# Patient Record
Sex: MALE | Race: ASIAN
[De-identification: names, ages, dates, MRNs, and addresses within clinical notes are randomized per-mention and may not be internally consistent; named-entity substitution may affect disease eponyms.]

---

## 2019-05-08 ENCOUNTER — RX RENEWAL (OUTPATIENT)
Age: 49
End: 2019-05-08

## 2019-07-19 ENCOUNTER — LABORATORY RESULT (OUTPATIENT)
Age: 49
End: 2019-07-19

## 2019-07-19 ENCOUNTER — APPOINTMENT (OUTPATIENT)
Dept: PULMONOLOGY | Facility: CLINIC | Age: 49
End: 2019-07-19
Payer: COMMERCIAL

## 2019-07-19 ENCOUNTER — NON-APPOINTMENT (OUTPATIENT)
Age: 49
End: 2019-07-19

## 2019-07-19 ENCOUNTER — RESULT CHARGE (OUTPATIENT)
Age: 49
End: 2019-07-19

## 2019-07-19 VITALS
DIASTOLIC BLOOD PRESSURE: 83 MMHG | OXYGEN SATURATION: 95 % | WEIGHT: 150 LBS | HEIGHT: 67 IN | SYSTOLIC BLOOD PRESSURE: 127 MMHG | HEART RATE: 56 BPM | BODY MASS INDEX: 23.54 KG/M2

## 2019-07-19 PROCEDURE — 99396 PREV VISIT EST AGE 40-64: CPT | Mod: 25

## 2019-07-19 PROCEDURE — 81003 URINALYSIS AUTO W/O SCOPE: CPT | Mod: QW

## 2019-07-19 PROCEDURE — 93000 ELECTROCARDIOGRAM COMPLETE: CPT

## 2019-07-19 PROCEDURE — 36415 COLL VENOUS BLD VENIPUNCTURE: CPT

## 2019-07-19 NOTE — DATA REVIEWED
[FreeTextEntry1] : EKG showed sinus bradycardia at 55 beats per minute, WPW pattern, no acute ST changes\par \par Urinalysis is negative

## 2019-07-19 NOTE — PLAN
[FreeTextEntry1] : Venipuncture with labs drawn in office\par GI evaluation with Dr. Robert Brunner for colonoscopy

## 2019-07-21 LAB
25(OH)D3 SERPL-MCNC: 32.3 NG/ML
ALBUMIN SERPL ELPH-MCNC: 4.8 G/DL
ALP BLD-CCNC: 54 U/L
ALT SERPL-CCNC: 34 U/L
ANION GAP SERPL CALC-SCNC: 13 MMOL/L
AST SERPL-CCNC: 24 U/L
BASOPHILS # BLD AUTO: 0.11 K/UL
BASOPHILS NFR BLD AUTO: 1.8 %
BILIRUB SERPL-MCNC: 0.7 MG/DL
BILIRUB UR QL STRIP: NORMAL
BUN SERPL-MCNC: 11 MG/DL
CALCIUM SERPL-MCNC: 9.5 MG/DL
CHLORIDE SERPL-SCNC: 103 MMOL/L
CHOLEST SERPL-MCNC: 157 MG/DL
CHOLEST/HDLC SERPL: 3.1 RATIO
CK SERPL-CCNC: 101 U/L
CO2 SERPL-SCNC: 26 MMOL/L
CREAT SERPL-MCNC: 0.92 MG/DL
EOSINOPHIL # BLD AUTO: 0.2 K/UL
EOSINOPHIL NFR BLD AUTO: 3.5 %
ESTIMATED AVERAGE GLUCOSE: 111 MG/DL
GLUCOSE SERPL-MCNC: 91 MG/DL
GLUCOSE UR-MCNC: NORMAL
HBA1C MFR BLD HPLC: 5.5 %
HCG UR QL: 0.2 EU/DL
HCT VFR BLD CALC: 46.2 %
HCV AB SER QL: NONREACTIVE
HCV S/CO RATIO: 0.11 S/CO
HDLC SERPL-MCNC: 50 MG/DL
HGB BLD-MCNC: 15 G/DL
HGB UR QL STRIP.AUTO: NORMAL
KETONES UR-MCNC: NORMAL
LDLC SERPL CALC-MCNC: 92 MG/DL
LEUKOCYTE ESTERASE UR QL STRIP: NORMAL
LYMPHOCYTES # BLD AUTO: 1.74 K/UL
LYMPHOCYTES NFR BLD AUTO: 29.8 %
MAGNESIUM SERPL-MCNC: 2.2 MG/DL
MAN DIFF?: NORMAL
MCHC RBC-ENTMCNC: 31.3 PG
MCHC RBC-ENTMCNC: 32.5 GM/DL
MCV RBC AUTO: 96.3 FL
MONOCYTES # BLD AUTO: 0.46 K/UL
MONOCYTES NFR BLD AUTO: 7.9 %
NEUTROPHILS # BLD AUTO: 3.33 K/UL
NEUTROPHILS NFR BLD AUTO: 57 %
NITRITE UR QL STRIP: NORMAL
PH UR STRIP: 7
PHOSPHATE SERPL-MCNC: 3.2 MG/DL
PLATELET # BLD AUTO: 185 K/UL
POTASSIUM SERPL-SCNC: 4.7 MMOL/L
PROT SERPL-MCNC: 6.8 G/DL
PROT UR STRIP-MCNC: NORMAL
PSA SERPL-MCNC: 0.46 NG/ML
RBC # BLD: 4.8 M/UL
RBC # FLD: 12.2 %
SODIUM SERPL-SCNC: 142 MMOL/L
SP GR UR STRIP: 1.01
T3 SERPL-MCNC: 96 NG/DL
T3RU NFR SERPL: 0.9 TBI
T4 FREE SERPL-MCNC: 1.5 NG/DL
T4 SERPL-MCNC: 6.2 UG/DL
TRIGL SERPL-MCNC: 74 MG/DL
TSH SERPL-ACNC: 1.73 UIU/ML
WBC # FLD AUTO: 5.85 K/UL

## 2019-09-05 ENCOUNTER — APPOINTMENT (OUTPATIENT)
Dept: GASTROENTEROLOGY | Facility: CLINIC | Age: 49
End: 2019-09-05

## 2020-05-27 ENCOUNTER — RX RENEWAL (OUTPATIENT)
Age: 50
End: 2020-05-27

## 2021-04-21 ENCOUNTER — RX RENEWAL (OUTPATIENT)
Age: 51
End: 2021-04-21

## 2021-05-03 ENCOUNTER — NON-APPOINTMENT (OUTPATIENT)
Age: 51
End: 2021-05-03

## 2021-05-27 ENCOUNTER — LABORATORY RESULT (OUTPATIENT)
Age: 51
End: 2021-05-27

## 2021-05-27 ENCOUNTER — NON-APPOINTMENT (OUTPATIENT)
Age: 51
End: 2021-05-27

## 2021-05-27 ENCOUNTER — APPOINTMENT (OUTPATIENT)
Dept: PULMONOLOGY | Facility: CLINIC | Age: 51
End: 2021-05-27
Payer: COMMERCIAL

## 2021-05-27 VITALS
WEIGHT: 159.56 LBS | DIASTOLIC BLOOD PRESSURE: 83 MMHG | BODY MASS INDEX: 25.04 KG/M2 | HEART RATE: 64 BPM | SYSTOLIC BLOOD PRESSURE: 116 MMHG | TEMPERATURE: 98.2 F | HEIGHT: 67 IN | OXYGEN SATURATION: 96 %

## 2021-05-27 LAB
BILIRUB UR QL STRIP: NORMAL
CLARITY UR: CLEAR
COLLECTION METHOD: NORMAL
GLUCOSE UR-MCNC: NORMAL
HCG UR QL: 0.2 EU/DL
HGB UR QL STRIP.AUTO: NORMAL
KETONES UR-MCNC: NORMAL
LEUKOCYTE ESTERASE UR QL STRIP: NORMAL
NITRITE UR QL STRIP: NORMAL
PH UR STRIP: 7
PROT UR STRIP-MCNC: NORMAL
SP GR UR STRIP: 1.01

## 2021-05-27 PROCEDURE — 99072 ADDL SUPL MATRL&STAF TM PHE: CPT

## 2021-05-27 PROCEDURE — 99396 PREV VISIT EST AGE 40-64: CPT | Mod: 25

## 2021-05-27 PROCEDURE — 36415 COLL VENOUS BLD VENIPUNCTURE: CPT

## 2021-05-27 PROCEDURE — 93000 ELECTROCARDIOGRAM COMPLETE: CPT

## 2021-05-27 PROCEDURE — 82044 UR ALBUMIN SEMIQUANTITATIVE: CPT | Mod: QW

## 2021-05-27 PROCEDURE — 81003 URINALYSIS AUTO W/O SCOPE: CPT | Mod: QW

## 2021-05-29 NOTE — DATA REVIEWED
[FreeTextEntry1] : EKG showed sinus rhythm at 61 bpm, WPW pattern.\par \par \par  POCT - A Urinalysis Dip (Automated)             Final\par \par No Documents Attached\par \par \par   Test   Result   Flag Reference Goal \par   Glucose NEG      \par   Bilirubin NEG      \par   Ketone NEG      \par   Specific Gravity 1.010      \par   Blood NEG      \par   pH 7.0      \par   Protein NEG      \par   Urobilinogen 0.2 EU/dl      \par   Nitrite NEG      \par   Leukocytes NEG      \par   Clarity Clear      \par   Collection Method Void      \par \par  Ordered by: NATALIE ENNIS       Collected/Examined: [id number] 09:43AM       \par Verified by: NATALIE ENNIS [id number] 10:06AM       \par  Result Communication: No patient communication needed at this time;\par Stage: Final       \par  Performed at: In Office       Performed by: NATALIE ENNIS       Resulted: [id number] 09:43AM       Last Updated: [id number] 10:06AM       Accession: [id number]       \par

## 2021-05-29 NOTE — PLAN
[FreeTextEntry1] : Dr. Shan Escobar cardiology evaluation for WPW pattern shown on EKG.\par Venipuncture with labs drawn in office\par Ergocalciferol for vitamin D deficiency

## 2021-05-30 LAB
25(OH)D3 SERPL-MCNC: 20.7 NG/ML
ALBUMIN SERPL ELPH-MCNC: 4.6 G/DL
ALBUMIN: 10
ALP BLD-CCNC: 52 U/L
ALT SERPL-CCNC: 29 U/L
ANION GAP SERPL CALC-SCNC: 12 MMOL/L
AST SERPL-CCNC: 15 U/L
BASOPHILS # BLD AUTO: 0.05 K/UL
BASOPHILS NFR BLD AUTO: 0.7 %
BILIRUB SERPL-MCNC: 0.6 MG/DL
BUN SERPL-MCNC: 9 MG/DL
CALCIUM SERPL-MCNC: 9.2 MG/DL
CHLORIDE SERPL-SCNC: 103 MMOL/L
CHOLEST SERPL-MCNC: 204 MG/DL
CO2 SERPL-SCNC: 26 MMOL/L
CREAT SERPL-MCNC: 0.75 MG/DL
CREATININE: 50
EOSINOPHIL # BLD AUTO: 0.25 K/UL
EOSINOPHIL NFR BLD AUTO: 3.7 %
ESTIMATED AVERAGE GLUCOSE: 108 MG/DL
GLUCOSE SERPL-MCNC: 83 MG/DL
HBA1C MFR BLD HPLC: 5.4 %
HCT VFR BLD CALC: 45.9 %
HCV AB SER QL: NONREACTIVE
HCV S/CO RATIO: 0.11 S/CO
HDLC SERPL-MCNC: 49 MG/DL
HGB BLD-MCNC: 15.1 G/DL
IMM GRANULOCYTES NFR BLD AUTO: 0.1 %
LDLC SERPL CALC-MCNC: 132 MG/DL
LYMPHOCYTES # BLD AUTO: 1.61 K/UL
LYMPHOCYTES NFR BLD AUTO: 23.7 %
MAGNESIUM SERPL-MCNC: 2.1 MG/DL
MAN DIFF?: NORMAL
MCHC RBC-ENTMCNC: 31.1 PG
MCHC RBC-ENTMCNC: 32.9 GM/DL
MCV RBC AUTO: 94.6 FL
MICROALBUMIN/CREAT UR TEST STR-RTO: 30
MONOCYTES # BLD AUTO: 0.49 K/UL
MONOCYTES NFR BLD AUTO: 7.2 %
NEUTROPHILS # BLD AUTO: 4.38 K/UL
NEUTROPHILS NFR BLD AUTO: 64.6 %
NONHDLC SERPL-MCNC: 155 MG/DL
PHOSPHATE SERPL-MCNC: 3.3 MG/DL
PLATELET # BLD AUTO: 181 K/UL
POTASSIUM SERPL-SCNC: 3.9 MMOL/L
PROT SERPL-MCNC: 6.8 G/DL
PSA SERPL-MCNC: 0.44 NG/ML
RBC # BLD: 4.85 M/UL
RBC # FLD: 11.9 %
SODIUM SERPL-SCNC: 141 MMOL/L
T3 SERPL-MCNC: 96 NG/DL
T3RU NFR SERPL: 1 TBI
T4 FREE SERPL-MCNC: 1.5 NG/DL
T4 SERPL-MCNC: 6.7 UG/DL
TRIGL SERPL-MCNC: 116 MG/DL
TSH SERPL-ACNC: 1.57 UIU/ML
WBC # FLD AUTO: 6.79 K/UL

## 2021-05-30 RX ORDER — ERGOCALCIFEROL 1.25 MG/1
1.25 MG CAPSULE, LIQUID FILLED ORAL
Qty: 12 | Refills: 3 | Status: ACTIVE | COMMUNITY
Start: 2021-05-30 | End: 1900-01-01

## 2021-06-03 ENCOUNTER — TRANSCRIPTION ENCOUNTER (OUTPATIENT)
Age: 51
End: 2021-06-03

## 2022-03-31 ENCOUNTER — RX RENEWAL (OUTPATIENT)
Age: 52
End: 2022-03-31

## 2022-06-22 ENCOUNTER — NON-APPOINTMENT (OUTPATIENT)
Age: 52
End: 2022-06-22

## 2022-07-22 ENCOUNTER — LABORATORY RESULT (OUTPATIENT)
Age: 52
End: 2022-07-22

## 2022-07-22 ENCOUNTER — NON-APPOINTMENT (OUTPATIENT)
Age: 52
End: 2022-07-22

## 2022-07-22 ENCOUNTER — APPOINTMENT (OUTPATIENT)
Dept: PULMONOLOGY | Facility: CLINIC | Age: 52
End: 2022-07-22
Payer: COMMERCIAL

## 2022-07-22 VITALS
OXYGEN SATURATION: 96 % | HEART RATE: 76 BPM | DIASTOLIC BLOOD PRESSURE: 87 MMHG | WEIGHT: 151 LBS | SYSTOLIC BLOOD PRESSURE: 133 MMHG | BODY MASS INDEX: 23.7 KG/M2 | TEMPERATURE: 97.9 F | HEIGHT: 67 IN

## 2022-07-22 PROCEDURE — 99396 PREV VISIT EST AGE 40-64: CPT | Mod: 25

## 2022-07-22 PROCEDURE — 82044 UR ALBUMIN SEMIQUANTITATIVE: CPT | Mod: QW

## 2022-07-22 PROCEDURE — 81003 URINALYSIS AUTO W/O SCOPE: CPT | Mod: QW

## 2022-07-22 PROCEDURE — 36415 COLL VENOUS BLD VENIPUNCTURE: CPT

## 2022-07-22 PROCEDURE — 93000 ELECTROCARDIOGRAM COMPLETE: CPT

## 2022-07-22 RX ORDER — ZOSTER VACCINE RECOMBINANT, ADJUVANTED 50 MCG/0.5
50 KIT INTRAMUSCULAR
Qty: 1 | Refills: 1 | Status: ACTIVE | COMMUNITY
Start: 2022-07-22 | End: 1900-01-01

## 2022-07-24 NOTE — PLAN
[FreeTextEntry1] : Venipuncture with labs drawn in office\par Age-appropriate counseling and preventive care screening discussed.\par Advised him to get colonoscopy with Dr. Nielsen.\par Start home blood pressure log book.\par Return for blood pressure follow-up in 1 month.

## 2022-07-24 NOTE — HISTORY OF PRESENT ILLNESS
[FreeTextEntry1] : History: Is a pleasant 52-year-old gentleman without significant past medical history, who came in for annual physical examination today.

## 2022-07-24 NOTE — DATA REVIEWED
[FreeTextEntry1] : EKG shows sinus rhythm at 60 bpm, old WPW pattern.\par \par \par  POCT - A Urinalysis Dip (Automated)             Final\par \par No Documents Attached\par \par \par   Test   Result   Flag Reference Goal Last Verified \par   Glucose NEGATIVE      REQUIRED \par   Bilirubin NEGATIVE      REQUIRED \par   Ketone NEGATIVE      REQUIRED \par   Specific Gravity 1.015      REQUIRED \par   Blood NEGATIVE      REQUIRED \par   pH 8.5      REQUIRED \par   Protein NEGATIVE      REQUIRED \par   Urobilinogen 0.2 EU/dl      REQUIRED \par   Nitrite NEGATIVE      REQUIRED \par   Leukocytes NEGATIVE      REQUIRED \par   Clarity Clear      REQUIRED \par   Collection Method Void      REQUIRED \par \par  Ordered by: NATALIE ENNIS       Collected/Examined: [id number] 09:19AM       \par Verification Required       Stage: Final       \par  Performed at: In Office       Performed by: NATALIE ENNIS       Resulted: [id number] 09:19AM       Last Updated: [id number] 09:20AM       \par

## 2022-07-26 LAB
25(OH)D3 SERPL-MCNC: 32.8 NG/ML
ALBUMIN SERPL ELPH-MCNC: 4.8 G/DL
ALBUMIN: 30
ALP BLD-CCNC: 45 U/L
ALT SERPL-CCNC: 26 U/L
ANION GAP SERPL CALC-SCNC: 13 MMOL/L
AST SERPL-CCNC: 20 U/L
BASOPHILS # BLD AUTO: 0.03 K/UL
BASOPHILS NFR BLD AUTO: 0.5 %
BILIRUB SERPL-MCNC: 0.6 MG/DL
BILIRUB UR QL STRIP: NEGATIVE
BUN SERPL-MCNC: 12 MG/DL
CALCIUM SERPL-MCNC: 9.4 MG/DL
CHLORIDE SERPL-SCNC: 105 MMOL/L
CHOLEST SERPL-MCNC: 184 MG/DL
CLARITY UR: CLEAR
CO2 SERPL-SCNC: 24 MMOL/L
COLLECTION METHOD: NORMAL
CREAT SERPL-MCNC: 0.86 MG/DL
CREATININE: 200
EGFR: 104 ML/MIN/1.73M2
EOSINOPHIL # BLD AUTO: 0.22 K/UL
EOSINOPHIL NFR BLD AUTO: 3.4 %
GLUCOSE SERPL-MCNC: 96 MG/DL
GLUCOSE UR-MCNC: NEGATIVE
HCG UR QL: 0.2 EU/DL
HCT VFR BLD CALC: 44.8 %
HCV AB SER QL: NONREACTIVE
HCV S/CO RATIO: 0.11 S/CO
HDLC SERPL-MCNC: 54 MG/DL
HGB BLD-MCNC: 15.4 G/DL
HGB UR QL STRIP.AUTO: NEGATIVE
IMM GRANULOCYTES NFR BLD AUTO: 0.2 %
KETONES UR-MCNC: NEGATIVE
LDLC SERPL CALC-MCNC: 112 MG/DL
LEUKOCYTE ESTERASE UR QL STRIP: NEGATIVE
LYMPHOCYTES # BLD AUTO: 1.56 K/UL
LYMPHOCYTES NFR BLD AUTO: 24.2 %
MAGNESIUM SERPL-MCNC: 2.2 MG/DL
MAN DIFF?: NORMAL
MCHC RBC-ENTMCNC: 31.8 PG
MCHC RBC-ENTMCNC: 34.4 GM/DL
MCV RBC AUTO: 92.4 FL
MICROALBUMIN/CREAT UR TEST STR-RTO: 30
MONOCYTES # BLD AUTO: 0.42 K/UL
MONOCYTES NFR BLD AUTO: 6.5 %
NEUTROPHILS # BLD AUTO: 4.2 K/UL
NEUTROPHILS NFR BLD AUTO: 65.2 %
NITRITE UR QL STRIP: NEGATIVE
NONHDLC SERPL-MCNC: 130 MG/DL
PH UR STRIP: 8.5
PHOSPHATE SERPL-MCNC: 2.9 MG/DL
PLATELET # BLD AUTO: 190 K/UL
POTASSIUM SERPL-SCNC: 3.8 MMOL/L
PROT SERPL-MCNC: 6.8 G/DL
PROT UR STRIP-MCNC: NEGATIVE
PSA SERPL-MCNC: 0.42 NG/ML
RBC # BLD: 4.85 M/UL
RBC # FLD: 12.3 %
SODIUM SERPL-SCNC: 142 MMOL/L
SP GR UR STRIP: 1.01
T3 SERPL-MCNC: 97 NG/DL
T3RU NFR SERPL: 1 TBI
T4 FREE SERPL-MCNC: 1.4 NG/DL
T4 SERPL-MCNC: 6.8 UG/DL
TRIGL SERPL-MCNC: 89 MG/DL
TSH SERPL-ACNC: 2.18 UIU/ML
WBC # FLD AUTO: 6.44 K/UL

## 2022-08-29 RX ORDER — METHYLPREDNISOLONE 4 MG/1
4 TABLET ORAL
Qty: 1 | Refills: 0 | Status: ACTIVE | COMMUNITY
Start: 2022-08-29 | End: 1900-01-01

## 2022-08-29 RX ORDER — AZITHROMYCIN 250 MG/1
250 TABLET, FILM COATED ORAL
Qty: 1 | Refills: 0 | Status: ACTIVE | COMMUNITY
Start: 2022-08-29 | End: 1900-01-01

## 2022-09-02 ENCOUNTER — APPOINTMENT (OUTPATIENT)
Dept: PULMONOLOGY | Facility: CLINIC | Age: 52
End: 2022-09-02

## 2022-10-05 ENCOUNTER — NON-APPOINTMENT (OUTPATIENT)
Age: 52
End: 2022-10-05

## 2022-10-07 ENCOUNTER — APPOINTMENT (OUTPATIENT)
Dept: PULMONOLOGY | Facility: CLINIC | Age: 52
End: 2022-10-07

## 2022-10-07 VITALS
HEIGHT: 67 IN | BODY MASS INDEX: 23.7 KG/M2 | TEMPERATURE: 98.3 F | SYSTOLIC BLOOD PRESSURE: 133 MMHG | DIASTOLIC BLOOD PRESSURE: 81 MMHG | WEIGHT: 151 LBS | OXYGEN SATURATION: 95 % | HEART RATE: 63 BPM

## 2022-10-07 PROCEDURE — 99214 OFFICE O/P EST MOD 30 MIN: CPT

## 2022-10-07 RX ORDER — VALACYCLOVIR 1 G/1
1 TABLET, FILM COATED ORAL 3 TIMES DAILY
Qty: 30 | Refills: 0 | Status: ACTIVE | COMMUNITY
Start: 2022-10-07 | End: 1900-01-01

## 2022-10-07 RX ORDER — ACYCLOVIR 50 MG/G
5 CREAM TOPICAL
Qty: 3 | Refills: 5 | Status: ACTIVE | COMMUNITY
Start: 2022-10-07 | End: 1900-01-01

## 2022-10-08 NOTE — ASSESSMENT
[FreeTextEntry1] : Generalized erythematous blistery rash, likely secondary to herpes zoster infection

## 2022-10-08 NOTE — PLAN
[FreeTextEntry1] : Start Valtrex for 10 days.\par Start acyclovir cream for herpetic rash.\par Get Shingrix vaccine at next office visit in 1 month.\par Continue Crestor 10 mg p.o. daily for hypercholesterolemia.

## 2022-10-08 NOTE — REVIEW OF SYSTEMS
[Itching] : itching [Skin Rash] : skin rash [Negative] : Heme/Lymph [de-identified] : Generalized erythematous blistery rash

## 2022-10-08 NOTE — PHYSICAL EXAM
[No Acute Distress] : no acute distress [Well Nourished] : well nourished [Well Developed] : well developed [Well-Appearing] : well-appearing [Normal Sclera/Conjunctiva] : normal sclera/conjunctiva [PERRL] : pupils equal round and reactive to light [EOMI] : extraocular movements intact [Normal Outer Ear/Nose] : the outer ears and nose were normal in appearance [Normal Oropharynx] : the oropharynx was normal [No JVD] : no jugular venous distention [No Lymphadenopathy] : no lymphadenopathy [Supple] : supple [No Respiratory Distress] : no respiratory distress  [Thyroid Normal, No Nodules] : the thyroid was normal and there were no nodules present [No Accessory Muscle Use] : no accessory muscle use [Clear to Auscultation] : lungs were clear to auscultation bilaterally [Normal Rate] : normal rate  [Regular Rhythm] : with a regular rhythm [Normal S1, S2] : normal S1 and S2 [No Murmur] : no murmur heard [No Carotid Bruits] : no carotid bruits [No Abdominal Bruit] : a ~M bruit was not heard ~T in the abdomen [No Varicosities] : no varicosities [Pedal Pulses Present] : the pedal pulses are present [No Edema] : there was no peripheral edema [No Palpable Aorta] : no palpable aorta [No Extremity Clubbing/Cyanosis] : no extremity clubbing/cyanosis [Soft] : abdomen soft [Non Tender] : non-tender [Non-distended] : non-distended [No Masses] : no abdominal mass palpated [No HSM] : no HSM [Normal Bowel Sounds] : normal bowel sounds [Normal Posterior Cervical Nodes] : no posterior cervical lymphadenopathy [Normal Anterior Cervical Nodes] : no anterior cervical lymphadenopathy [No CVA Tenderness] : no CVA  tenderness [No Spinal Tenderness] : no spinal tenderness [No Joint Swelling] : no joint swelling [Grossly Normal Strength/Tone] : grossly normal strength/tone [Coordination Grossly Intact] : coordination grossly intact [No Focal Deficits] : no focal deficits [Normal Gait] : normal gait [Deep Tendon Reflexes (DTR)] : deep tendon reflexes were 2+ and symmetric [Normal Affect] : the affect was normal [Normal Insight/Judgement] : insight and judgment were intact [de-identified] : Generalized erythematous blistery rash

## 2022-10-08 NOTE — HISTORY OF PRESENT ILLNESS
[FreeTextEntry1] : Recently had COVID 19 infection, then started developing patchy itchy rash starting from 9/12, first started around his right hip and right flank, then spread to rest of the body.

## 2022-11-04 ENCOUNTER — MED ADMIN CHARGE (OUTPATIENT)
Age: 52
End: 2022-11-04

## 2022-11-04 ENCOUNTER — APPOINTMENT (OUTPATIENT)
Dept: PULMONOLOGY | Facility: CLINIC | Age: 52
End: 2022-11-04

## 2022-11-04 VITALS — OXYGEN SATURATION: 98 % | HEART RATE: 86 BPM | SYSTOLIC BLOOD PRESSURE: 147 MMHG | DIASTOLIC BLOOD PRESSURE: 95 MMHG

## 2022-11-04 DIAGNOSIS — B02.9 ZOSTER W/OUT COMPLICATIONS: ICD-10-CM

## 2022-11-04 DIAGNOSIS — Z23 ENCOUNTER FOR IMMUNIZATION: ICD-10-CM

## 2022-11-04 PROCEDURE — 90750 HZV VACC RECOMBINANT IM: CPT

## 2022-11-04 PROCEDURE — 99214 OFFICE O/P EST MOD 30 MIN: CPT | Mod: 25

## 2022-11-04 PROCEDURE — 90471 IMMUNIZATION ADMIN: CPT

## 2022-11-04 RX ORDER — PREDNISONE 10 MG/1
10 TABLET ORAL
Qty: 12 | Refills: 0 | Status: ACTIVE | COMMUNITY
Start: 2022-11-04 | End: 1900-01-01

## 2022-11-05 PROBLEM — B02.9 HERPES ZOSTER: Status: ACTIVE | Noted: 2022-10-07

## 2022-11-05 NOTE — HISTORY OF PRESENT ILLNESS
[FreeTextEntry1] : Recently had COVID 19 infection, then started developing patchy itchy rash starting from 9/12, first started around his right hip and right flank, then spread to rest of the body.  \par Rash is better, still has fair amount of rash on his head.

## 2022-11-05 NOTE — ASSESSMENT
[FreeTextEntry1] : Improved generalized erythematous blistery rash, likely secondary to herpes zoster infection

## 2022-11-05 NOTE — PHYSICAL EXAM
[No Acute Distress] : no acute distress [Well Nourished] : well nourished [Well Developed] : well developed [Well-Appearing] : well-appearing [Normal Sclera/Conjunctiva] : normal sclera/conjunctiva [PERRL] : pupils equal round and reactive to light [EOMI] : extraocular movements intact [Normal Outer Ear/Nose] : the outer ears and nose were normal in appearance [Normal Oropharynx] : the oropharynx was normal [No JVD] : no jugular venous distention [No Lymphadenopathy] : no lymphadenopathy [Supple] : supple [Thyroid Normal, No Nodules] : the thyroid was normal and there were no nodules present [No Respiratory Distress] : no respiratory distress  [No Accessory Muscle Use] : no accessory muscle use [Clear to Auscultation] : lungs were clear to auscultation bilaterally [Normal Rate] : normal rate  [Regular Rhythm] : with a regular rhythm [Normal S1, S2] : normal S1 and S2 [No Murmur] : no murmur heard [No Carotid Bruits] : no carotid bruits [No Abdominal Bruit] : a ~M bruit was not heard ~T in the abdomen [No Varicosities] : no varicosities [Pedal Pulses Present] : the pedal pulses are present [No Edema] : there was no peripheral edema [No Palpable Aorta] : no palpable aorta [No Extremity Clubbing/Cyanosis] : no extremity clubbing/cyanosis [Soft] : abdomen soft [Non Tender] : non-tender [Non-distended] : non-distended [No Masses] : no abdominal mass palpated [No HSM] : no HSM [Normal Bowel Sounds] : normal bowel sounds [Normal Posterior Cervical Nodes] : no posterior cervical lymphadenopathy [Normal Anterior Cervical Nodes] : no anterior cervical lymphadenopathy [No CVA Tenderness] : no CVA  tenderness [No Spinal Tenderness] : no spinal tenderness [No Joint Swelling] : no joint swelling [Grossly Normal Strength/Tone] : grossly normal strength/tone [Coordination Grossly Intact] : coordination grossly intact [No Focal Deficits] : no focal deficits [Normal Gait] : normal gait [Deep Tendon Reflexes (DTR)] : deep tendon reflexes were 2+ and symmetric [Normal Affect] : the affect was normal [Normal Insight/Judgement] : insight and judgment were intact [de-identified] : Improved generalized erythematous blistery rash

## 2022-11-05 NOTE — REVIEW OF SYSTEMS
[Itching] : itching [Skin Rash] : skin rash [Negative] : Heme/Lymph [de-identified] : Improved generalized erythematous blistery rash

## 2022-11-05 NOTE — PLAN
[FreeTextEntry1] : Start prednisone taper\par Start Abreva cream.\par Shingrix vaccine 1 given in office today, return in 3 months for #2 vaccine.\par Continue Crestor 10 mg p.o. daily for hypercholesterolemia.

## 2023-01-03 DIAGNOSIS — U07.1 COVID-19: ICD-10-CM

## 2023-01-25 ENCOUNTER — NON-APPOINTMENT (OUTPATIENT)
Age: 53
End: 2023-01-25

## 2023-03-05 ENCOUNTER — RX RENEWAL (OUTPATIENT)
Age: 53
End: 2023-03-05

## 2023-03-10 ENCOUNTER — APPOINTMENT (OUTPATIENT)
Dept: PULMONOLOGY | Facility: CLINIC | Age: 53
End: 2023-03-10

## 2023-04-25 RX ORDER — NIRMATRELVIR AND RITONAVIR 300-100 MG
20 X 150 MG & KIT ORAL
Qty: 30 | Refills: 0 | Status: ACTIVE | COMMUNITY
Start: 2023-01-03 | End: 1900-01-01

## 2024-01-17 ENCOUNTER — LABORATORY RESULT (OUTPATIENT)
Age: 54
End: 2024-01-17

## 2024-01-17 ENCOUNTER — APPOINTMENT (OUTPATIENT)
Dept: PULMONOLOGY | Facility: CLINIC | Age: 54
End: 2024-01-17
Payer: COMMERCIAL

## 2024-01-17 VITALS
HEART RATE: 61 BPM | WEIGHT: 154.13 LBS | BODY MASS INDEX: 24.14 KG/M2 | SYSTOLIC BLOOD PRESSURE: 147 MMHG | OXYGEN SATURATION: 95 % | RESPIRATION RATE: 16 BRPM | DIASTOLIC BLOOD PRESSURE: 83 MMHG

## 2024-01-17 DIAGNOSIS — E55.9 VITAMIN D DEFICIENCY, UNSPECIFIED: ICD-10-CM

## 2024-01-17 DIAGNOSIS — R07.89 OTHER CHEST PAIN: ICD-10-CM

## 2024-01-17 DIAGNOSIS — R03.0 ELEVATED BLOOD-PRESSURE READING, W/OUT DIAGNOSIS OF HYPERTENSION: ICD-10-CM

## 2024-01-17 DIAGNOSIS — R21 RASH AND OTHER NONSPECIFIC SKIN ERUPTION: ICD-10-CM

## 2024-01-17 DIAGNOSIS — E74.39 OTHER DISORDERS OF INTESTINAL CARBOHYDRATE ABSORPTION: ICD-10-CM

## 2024-01-17 DIAGNOSIS — Z00.00 ENCOUNTER FOR GENERAL ADULT MEDICAL EXAMINATION W/OUT ABNORMAL FINDINGS: ICD-10-CM

## 2024-01-17 DIAGNOSIS — L30.9 DERMATITIS, UNSPECIFIED: ICD-10-CM

## 2024-01-17 LAB
BILIRUB UR QL STRIP: NORMAL
CLARITY UR: CLEAR
COLLECTION METHOD: NORMAL
GLUCOSE UR-MCNC: NORMAL
HCG UR QL: 0.2 EU/DL
HGB UR QL STRIP.AUTO: NORMAL
KETONES UR-MCNC: NORMAL
LEUKOCYTE ESTERASE UR QL STRIP: NORMAL
NITRITE UR QL STRIP: NORMAL
PH UR STRIP: 7.5
PROT UR STRIP-MCNC: NORMAL
SP GR UR STRIP: 1.01

## 2024-01-17 PROCEDURE — 36415 COLL VENOUS BLD VENIPUNCTURE: CPT

## 2024-01-17 PROCEDURE — 83036 HEMOGLOBIN GLYCOSYLATED A1C: CPT | Mod: QW

## 2024-01-17 PROCEDURE — 82044 UR ALBUMIN SEMIQUANTITATIVE: CPT | Mod: QW

## 2024-01-17 PROCEDURE — 99396 PREV VISIT EST AGE 40-64: CPT | Mod: 25

## 2024-01-17 PROCEDURE — 93000 ELECTROCARDIOGRAM COMPLETE: CPT

## 2024-01-17 PROCEDURE — 81003 URINALYSIS AUTO W/O SCOPE: CPT | Mod: QW

## 2024-01-17 NOTE — DATA REVIEWED
[FreeTextEntry1] : EKG shows sinus bradycardia at 59 bpm, old WPW pattern. ___ Normal UA ___ Hgb A1C: 5.6

## 2024-01-17 NOTE — PHYSICAL EXAM
[No Acute Distress] : no acute distress [Well Nourished] : well nourished [Well Developed] : well developed [Well-Appearing] : well-appearing [Normal Sclera/Conjunctiva] : normal sclera/conjunctiva [PERRL] : pupils equal round and reactive to light [EOMI] : extraocular movements intact [Normal Outer Ear/Nose] : the outer ears and nose were normal in appearance [Normal Oropharynx] : the oropharynx was normal [No JVD] : no jugular venous distention [No Lymphadenopathy] : no lymphadenopathy [Supple] : supple [Thyroid Normal, No Nodules] : the thyroid was normal and there were no nodules present [No Respiratory Distress] : no respiratory distress  [No Accessory Muscle Use] : no accessory muscle use [Clear to Auscultation] : lungs were clear to auscultation bilaterally [Normal Rate] : normal rate  [Regular Rhythm] : with a regular rhythm [Normal S1, S2] : normal S1 and S2 [No Murmur] : no murmur heard [No Carotid Bruits] : no carotid bruits [No Abdominal Bruit] : a ~M bruit was not heard ~T in the abdomen [No Varicosities] : no varicosities [Pedal Pulses Present] : the pedal pulses are present [No Edema] : there was no peripheral edema [No Palpable Aorta] : no palpable aorta [No Extremity Clubbing/Cyanosis] : no extremity clubbing/cyanosis [Soft] : abdomen soft [Non Tender] : non-tender [Non-distended] : non-distended [No Masses] : no abdominal mass palpated [No HSM] : no HSM [Normal Bowel Sounds] : normal bowel sounds [Normal Posterior Cervical Nodes] : no posterior cervical lymphadenopathy [Normal Anterior Cervical Nodes] : no anterior cervical lymphadenopathy [No CVA Tenderness] : no CVA  tenderness [No Spinal Tenderness] : no spinal tenderness [No Joint Swelling] : no joint swelling [Grossly Normal Strength/Tone] : grossly normal strength/tone [Coordination Grossly Intact] : coordination grossly intact [No Focal Deficits] : no focal deficits [Normal Gait] : normal gait [Deep Tendon Reflexes (DTR)] : deep tendon reflexes were 2+ and symmetric [Normal Affect] : the affect was normal [Normal Insight/Judgement] : insight and judgment were intact [de-identified] : Mild generalized skin eczema

## 2024-01-17 NOTE — HISTORY OF PRESENT ILLNESS
[FreeTextEntry1] : recurrent eczema for 6 months [de-identified] : Mr. Solomon is a pleasant 53-year-old gentleman with history of hypercholesterolemia, WPW syndrome, borderline hypertension, vitamin D deficiency, he came in for annual physical examination today, complains of recurrent eczema for the last 6 months.  Has seen dermatologist and immunologist.

## 2024-01-17 NOTE — PLAN
[FreeTextEntry1] : Age appropriate preventative care counseling discussed with patient. Venipuncture with labs drawn in office. Obtained and reviewed EKG, Urinalysis, Hemoglobin A1C results with patient today. Advised patient to keep a daily blood pressure log book.  Advised patient on low-salt diet for better blood pressure control. Advised patient to receive immunologist evaluation for eczema. Advised patient to receive cardiac evaluation. Start metoprolol ER 12.5 mg p.o. daily for hypertension and WPW syndrome.   Continue Crestor 10 mg p.o. daily for hypercholesterolemia. Return for follow up in 1 month.

## 2024-01-17 NOTE — ASSESSMENT
[FreeTextEntry1] : Hypertension.  Hypercholesterolemia.  Vitamin D deficiency.  History of WPW syndrome. Elevated blood pressure secondary to Hypertension.

## 2024-01-22 LAB
25(OH)D3 SERPL-MCNC: 35.7 NG/ML
ALBUMIN SERPL ELPH-MCNC: 4.7 G/DL
ALBUMIN: 30
ALP BLD-CCNC: 52 U/L
ALT SERPL-CCNC: 32 U/L
ANION GAP SERPL CALC-SCNC: 9 MMOL/L
AST SERPL-CCNC: 26 U/L
BASOPHILS # BLD AUTO: 0.06 K/UL
BASOPHILS NFR BLD AUTO: 0.9 %
BILIRUB SERPL-MCNC: 0.6 MG/DL
BUN SERPL-MCNC: 10 MG/DL
CALCIUM SERPL-MCNC: 9.2 MG/DL
CHLORIDE SERPL-SCNC: 103 MMOL/L
CHOLEST SERPL-MCNC: 182 MG/DL
CO2 SERPL-SCNC: 26 MMOL/L
CREAT SERPL-MCNC: 0.77 MG/DL
CREATININE: 50
EGFR: 106 ML/MIN/1.73M2
EOSINOPHIL # BLD AUTO: 0.69 K/UL
EOSINOPHIL NFR BLD AUTO: 10.4 %
GLUCOSE SERPL-MCNC: 100 MG/DL
HBA1C MFR BLD HPLC: 5.6
HCT VFR BLD CALC: 47.3 %
HCV AB SER QL: NONREACTIVE
HCV S/CO RATIO: 0.12 S/CO
HDLC SERPL-MCNC: 53 MG/DL
HGB BLD-MCNC: 15.8 G/DL
IMM GRANULOCYTES NFR BLD AUTO: 0.2 %
LDLC SERPL CALC-MCNC: 112 MG/DL
LYMPHOCYTES # BLD AUTO: 1.41 K/UL
LYMPHOCYTES NFR BLD AUTO: 21.2 %
MAGNESIUM SERPL-MCNC: 2.2 MG/DL
MAN DIFF?: NORMAL
MCHC RBC-ENTMCNC: 31.3 PG
MCHC RBC-ENTMCNC: 33.4 GM/DL
MCV RBC AUTO: 93.7 FL
MICROALBUMIN/CREAT UR TEST STR-RTO: NORMAL
MONOCYTES # BLD AUTO: 0.45 K/UL
MONOCYTES NFR BLD AUTO: 6.8 %
NEUTROPHILS # BLD AUTO: 4.04 K/UL
NEUTROPHILS NFR BLD AUTO: 60.5 %
NONHDLC SERPL-MCNC: 129 MG/DL
PHOSPHATE SERPL-MCNC: 3.2 MG/DL
PLATELET # BLD AUTO: 202 K/UL
POTASSIUM SERPL-SCNC: 3.8 MMOL/L
PROT SERPL-MCNC: 6.9 G/DL
PSA SERPL-MCNC: 0.39 NG/ML
RBC # BLD: 5.05 M/UL
RBC # FLD: 12.1 %
SODIUM SERPL-SCNC: 139 MMOL/L
T3 SERPL-MCNC: 102 NG/DL
T3RU NFR SERPL: 1 TBI
T4 FREE SERPL-MCNC: 1.3 NG/DL
T4 SERPL-MCNC: 6.7 UG/DL
TOTAL IGE SMQN RAST: 72 KU/L
TRIGL SERPL-MCNC: 92 MG/DL
TSH SERPL-ACNC: 1.45 UIU/ML
WBC # FLD AUTO: 6.66 K/UL

## 2024-01-22 RX ORDER — MONTELUKAST 10 MG/1
10 TABLET, FILM COATED ORAL
Qty: 90 | Refills: 3 | Status: ACTIVE | COMMUNITY
Start: 2024-01-22 | End: 1900-01-01

## 2024-02-13 ENCOUNTER — RX RENEWAL (OUTPATIENT)
Age: 54
End: 2024-02-13

## 2024-02-16 ENCOUNTER — APPOINTMENT (OUTPATIENT)
Dept: PULMONOLOGY | Facility: CLINIC | Age: 54
End: 2024-02-16
Payer: COMMERCIAL

## 2024-02-16 VITALS
HEIGHT: 67 IN | SYSTOLIC BLOOD PRESSURE: 123 MMHG | WEIGHT: 155 LBS | DIASTOLIC BLOOD PRESSURE: 83 MMHG | HEART RATE: 59 BPM | OXYGEN SATURATION: 95 % | BODY MASS INDEX: 24.33 KG/M2

## 2024-02-16 DIAGNOSIS — I45.6 PRE-EXCITATION SYNDROME: ICD-10-CM

## 2024-02-16 DIAGNOSIS — E78.00 PURE HYPERCHOLESTEROLEMIA, UNSPECIFIED: ICD-10-CM

## 2024-02-16 DIAGNOSIS — I10 ESSENTIAL (PRIMARY) HYPERTENSION: ICD-10-CM

## 2024-02-16 PROCEDURE — 99213 OFFICE O/P EST LOW 20 MIN: CPT

## 2024-02-18 PROBLEM — I45.6: Status: ACTIVE | Noted: 2024-01-17

## 2024-02-18 NOTE — ASSESSMENT
[FreeTextEntry1] : Hypertension.  Hypercholesterolemia.  Vitamin D deficiency.  History of WPW syndrome.

## 2024-02-18 NOTE — PHYSICAL EXAM
[No Acute Distress] : no acute distress [Well Nourished] : well nourished [Well Developed] : well developed [Well-Appearing] : well-appearing [Normal Sclera/Conjunctiva] : normal sclera/conjunctiva [PERRL] : pupils equal round and reactive to light [EOMI] : extraocular movements intact [Normal Outer Ear/Nose] : the outer ears and nose were normal in appearance [Normal Oropharynx] : the oropharynx was normal [No JVD] : no jugular venous distention [No Lymphadenopathy] : no lymphadenopathy [Supple] : supple [Thyroid Normal, No Nodules] : the thyroid was normal and there were no nodules present [No Respiratory Distress] : no respiratory distress  [No Accessory Muscle Use] : no accessory muscle use [Clear to Auscultation] : lungs were clear to auscultation bilaterally [Normal Rate] : normal rate  [Regular Rhythm] : with a regular rhythm [Normal S1, S2] : normal S1 and S2 [No Murmur] : no murmur heard [No Carotid Bruits] : no carotid bruits [No Abdominal Bruit] : a ~M bruit was not heard ~T in the abdomen [No Varicosities] : no varicosities [Pedal Pulses Present] : the pedal pulses are present [No Edema] : there was no peripheral edema [No Palpable Aorta] : no palpable aorta [No Extremity Clubbing/Cyanosis] : no extremity clubbing/cyanosis [Soft] : abdomen soft [Non Tender] : non-tender [Non-distended] : non-distended [No Masses] : no abdominal mass palpated [No HSM] : no HSM [Normal Bowel Sounds] : normal bowel sounds [Normal Posterior Cervical Nodes] : no posterior cervical lymphadenopathy [Normal Anterior Cervical Nodes] : no anterior cervical lymphadenopathy [No CVA Tenderness] : no CVA  tenderness [No Spinal Tenderness] : no spinal tenderness [No Joint Swelling] : no joint swelling [Grossly Normal Strength/Tone] : grossly normal strength/tone [Coordination Grossly Intact] : coordination grossly intact [No Focal Deficits] : no focal deficits [Normal Gait] : normal gait [Deep Tendon Reflexes (DTR)] : deep tendon reflexes were 2+ and symmetric [Normal Affect] : the affect was normal [Normal Insight/Judgement] : insight and judgment were intact [de-identified] : Mild generalized skin eczema

## 2024-02-18 NOTE — PLAN
[FreeTextEntry1] : Advised patient on low-salt diet for better blood pressure control. Continue blood pressure logbook. Prescribed Losartan 25 mg p.o. daily for hypertension. Discontinue metoprolol due to side effects. Continue Crestor 10 mg p.o. daily for hypercholesterolemia. Return for follow up in 1 month,

## 2024-02-18 NOTE — HISTORY OF PRESENT ILLNESS
[FreeTextEntry1] : Follow up [de-identified] : SOHAN FLORES is a 53 year old male, with history of hypercholesterolemia, WPW syndrome, borderline hypertension, vitamin D deficiency, who presents to the office for follow up evaluation. Patient reports that he is feeling well overall with no complaints.

## 2024-03-13 RX ORDER — ROSUVASTATIN CALCIUM 10 MG/1
10 TABLET, FILM COATED ORAL
Qty: 90 | Refills: 3 | Status: ACTIVE | COMMUNITY
Start: 2019-05-08 | End: 1900-01-01

## 2024-03-13 RX ORDER — LOSARTAN POTASSIUM 25 MG/1
25 TABLET, FILM COATED ORAL
Qty: 90 | Refills: 3 | Status: ACTIVE | COMMUNITY
Start: 2024-02-16 | End: 1900-01-01

## 2024-03-22 ENCOUNTER — APPOINTMENT (OUTPATIENT)
Dept: PULMONOLOGY | Facility: CLINIC | Age: 54
End: 2024-03-22

## 2025-03-27 ENCOUNTER — NON-APPOINTMENT (OUTPATIENT)
Age: 55
End: 2025-03-27

## 2025-04-01 ENCOUNTER — LABORATORY RESULT (OUTPATIENT)
Age: 55
End: 2025-04-01

## 2025-04-01 ENCOUNTER — APPOINTMENT (OUTPATIENT)
Dept: PULMONOLOGY | Facility: CLINIC | Age: 55
End: 2025-04-01
Payer: COMMERCIAL

## 2025-04-01 VITALS
HEIGHT: 67 IN | WEIGHT: 157 LBS | HEART RATE: 57 BPM | SYSTOLIC BLOOD PRESSURE: 129 MMHG | OXYGEN SATURATION: 97 % | DIASTOLIC BLOOD PRESSURE: 78 MMHG | BODY MASS INDEX: 24.64 KG/M2

## 2025-04-01 DIAGNOSIS — E78.00 PURE HYPERCHOLESTEROLEMIA, UNSPECIFIED: ICD-10-CM

## 2025-04-01 DIAGNOSIS — E74.39 OTHER DISORDERS OF INTESTINAL CARBOHYDRATE ABSORPTION: ICD-10-CM

## 2025-04-01 DIAGNOSIS — Z00.00 ENCOUNTER FOR GENERAL ADULT MEDICAL EXAMINATION W/OUT ABNORMAL FINDINGS: ICD-10-CM

## 2025-04-01 DIAGNOSIS — R80.9 PROTEINURIA, UNSPECIFIED: ICD-10-CM

## 2025-04-01 DIAGNOSIS — R03.0 ELEVATED BLOOD-PRESSURE READING, W/OUT DIAGNOSIS OF HYPERTENSION: ICD-10-CM

## 2025-04-01 DIAGNOSIS — E55.9 VITAMIN D DEFICIENCY, UNSPECIFIED: ICD-10-CM

## 2025-04-01 DIAGNOSIS — I10 ESSENTIAL (PRIMARY) HYPERTENSION: ICD-10-CM

## 2025-04-01 LAB
ALBUMIN: 80
BILIRUB UR QL STRIP: NEGATIVE
CLARITY UR: CLEAR
COLLECTION METHOD: NORMAL
CREATININE: 200
GLUCOSE UR-MCNC: NEGATIVE
HCG UR QL: 0.2 EU/DL
HGB UR QL STRIP.AUTO: NEGATIVE
KETONES UR-MCNC: NEGATIVE
LEUKOCYTE ESTERASE UR QL STRIP: NEGATIVE
MICROALBUMIN/CREAT UR TEST STR-RTO: NORMAL
NITRITE UR QL STRIP: NEGATIVE
PH UR STRIP: 7
PROT UR STRIP-MCNC: NORMAL
SP GR UR STRIP: 1.02

## 2025-04-01 PROCEDURE — 82044 UR ALBUMIN SEMIQUANTITATIVE: CPT | Mod: QW

## 2025-04-01 PROCEDURE — 36415 COLL VENOUS BLD VENIPUNCTURE: CPT

## 2025-04-01 PROCEDURE — 99396 PREV VISIT EST AGE 40-64: CPT

## 2025-04-01 PROCEDURE — 81003 URINALYSIS AUTO W/O SCOPE: CPT | Mod: QW

## 2025-04-06 LAB
25(OH)D3 SERPL-MCNC: 35 NG/ML
ALBUMIN SERPL ELPH-MCNC: 4.5 G/DL
ALP BLD-CCNC: 52 U/L
ALT SERPL-CCNC: 27 U/L
ANION GAP SERPL CALC-SCNC: 13 MMOL/L
APPEARANCE: CLEAR
AST SERPL-CCNC: 21 U/L
BACTERIA UR CULT: NORMAL
BACTERIA: NEGATIVE /HPF
BASOPHILS # BLD AUTO: 0.04 K/UL
BASOPHILS NFR BLD AUTO: 0.6 %
BILIRUB SERPL-MCNC: 0.7 MG/DL
BILIRUBIN URINE: NEGATIVE
BLOOD URINE: NEGATIVE
BUN SERPL-MCNC: 12 MG/DL
CALCIUM SERPL-MCNC: 8.9 MG/DL
CAST: 0 /LPF
CHLORIDE SERPL-SCNC: 104 MMOL/L
CHOLEST SERPL-MCNC: 158 MG/DL
CO2 SERPL-SCNC: 24 MMOL/L
COLOR: YELLOW
CREAT SERPL-MCNC: 0.92 MG/DL
CREAT SPEC-SCNC: 195 MG/DL
EGFRCR SERPLBLD CKD-EPI 2021: 98 ML/MIN/1.73M2
EOSINOPHIL # BLD AUTO: 0.29 K/UL
EOSINOPHIL NFR BLD AUTO: 4.7 %
EPITHELIAL CELLS: 0 /HPF
ESTIMATED AVERAGE GLUCOSE: 105 MG/DL
GLUCOSE QUALITATIVE U: NEGATIVE MG/DL
GLUCOSE SERPL-MCNC: 88 MG/DL
HBA1C MFR BLD HPLC: 5.3 %
HCT VFR BLD CALC: 42 %
HCV AB SER QL: NONREACTIVE
HCV S/CO RATIO: 0.1 S/CO
HDLC SERPL-MCNC: 49 MG/DL
HGB BLD-MCNC: 14.6 G/DL
IMM GRANULOCYTES NFR BLD AUTO: 0.2 %
KETONES URINE: NEGATIVE MG/DL
LDLC SERPL-MCNC: 94 MG/DL
LEUKOCYTE ESTERASE URINE: NEGATIVE
LYMPHOCYTES # BLD AUTO: 1.54 K/UL
LYMPHOCYTES NFR BLD AUTO: 24.9 %
MAGNESIUM SERPL-MCNC: 2.2 MG/DL
MAN DIFF?: NORMAL
MCHC RBC-ENTMCNC: 32.3 PG
MCHC RBC-ENTMCNC: 34.8 G/DL
MCV RBC AUTO: 92.9 FL
MICROALBUMIN 24H UR DL<=1MG/L-MCNC: <1.2 MG/DL
MICROALBUMIN/CREAT 24H UR-RTO: NORMAL MG/G
MICROSCOPIC-UA: NORMAL
MONOCYTES # BLD AUTO: 0.44 K/UL
MONOCYTES NFR BLD AUTO: 7.1 %
NEUTROPHILS # BLD AUTO: 3.87 K/UL
NEUTROPHILS NFR BLD AUTO: 62.5 %
NITRITE URINE: NEGATIVE
NONHDLC SERPL-MCNC: 109 MG/DL
PH URINE: 7
PHOSPHATE SERPL-MCNC: 3 MG/DL
PLATELET # BLD AUTO: 181 K/UL
POTASSIUM SERPL-SCNC: 4.2 MMOL/L
PROT SERPL-MCNC: 6.6 G/DL
PROTEIN URINE: 30 MG/DL
PSA SERPL-MCNC: 0.37 NG/ML
RBC # BLD: 4.52 M/UL
RBC # FLD: 12.7 %
RED BLOOD CELLS URINE: 1 /HPF
SODIUM SERPL-SCNC: 141 MMOL/L
SPECIFIC GRAVITY URINE: 1.02
T3 SERPL-MCNC: 104 NG/DL
T3RU NFR SERPL: 1 TBI
T4 FREE SERPL-MCNC: 1.5 NG/DL
T4 SERPL-MCNC: 7.1 UG/DL
TRIGL SERPL-MCNC: 79 MG/DL
TSH SERPL-ACNC: 1 UIU/ML
UROBILINOGEN URINE: 0.2 MG/DL
WBC # FLD AUTO: 6.19 K/UL
WHITE BLOOD CELLS URINE: 0 /HPF

## 2025-07-14 ENCOUNTER — NON-APPOINTMENT (OUTPATIENT)
Age: 55
End: 2025-07-14

## 2025-07-15 ENCOUNTER — APPOINTMENT (OUTPATIENT)
Dept: PULMONOLOGY | Facility: CLINIC | Age: 55
End: 2025-07-15
Payer: COMMERCIAL

## 2025-07-15 VITALS — SYSTOLIC BLOOD PRESSURE: 122 MMHG | HEART RATE: 59 BPM | DIASTOLIC BLOOD PRESSURE: 75 MMHG | OXYGEN SATURATION: 96 %

## 2025-07-15 LAB
ALBUMIN: 10
BILIRUB UR QL STRIP: NORMAL
CLARITY UR: CLEAR
COLLECTION METHOD: NORMAL
CREATININE: 50
GLUCOSE UR-MCNC: NORMAL
HCG UR QL: 0.2 EU/DL
HGB UR QL STRIP.AUTO: NORMAL
KETONES UR-MCNC: NORMAL
LEUKOCYTE ESTERASE UR QL STRIP: NORMAL
MICROALBUMIN/CREAT UR TEST STR-RTO: NORMAL
NITRITE UR QL STRIP: NORMAL
PH UR STRIP: 6.5
PROT UR STRIP-MCNC: NORMAL
SP GR UR STRIP: 1

## 2025-07-15 PROCEDURE — 81003 URINALYSIS AUTO W/O SCOPE: CPT | Mod: QW

## 2025-07-15 PROCEDURE — 82044 UR ALBUMIN SEMIQUANTITATIVE: CPT | Mod: QW

## 2025-07-15 PROCEDURE — 99214 OFFICE O/P EST MOD 30 MIN: CPT

## 2025-07-15 PROCEDURE — G2211 COMPLEX E/M VISIT ADD ON: CPT | Mod: NC

## 2025-07-20 LAB
ALBUMIN, RANDOM URINE: <1.2 MG/DL
APPEARANCE: CLEAR
BACTERIA UR CULT: NORMAL
BACTERIA: NEGATIVE /HPF
BILIRUBIN URINE: NEGATIVE
BLOOD URINE: NEGATIVE
CAST: 0 /LPF
COLOR: YELLOW
CREAT SPEC-SCNC: 36 MG/DL
EPITHELIAL CELLS: 0 /HPF
GLUCOSE QUALITATIVE U: NEGATIVE MG/DL
KETONES URINE: NEGATIVE MG/DL
LEUKOCYTE ESTERASE URINE: NEGATIVE
MICROALBUMIN/CREAT 24H UR-RTO: NORMAL MG/G
MICROSCOPIC-UA: NORMAL
NITRITE URINE: NEGATIVE
PH URINE: 6.5
PROTEIN URINE: NEGATIVE MG/DL
RED BLOOD CELLS URINE: 1 /HPF
SPECIFIC GRAVITY URINE: 1.01
UROBILINOGEN URINE: 0.2 MG/DL
WHITE BLOOD CELLS URINE: 0 /HPF